# Patient Record
Sex: FEMALE | Race: WHITE
[De-identification: names, ages, dates, MRNs, and addresses within clinical notes are randomized per-mention and may not be internally consistent; named-entity substitution may affect disease eponyms.]

---

## 2021-04-15 DIAGNOSIS — Z23 ENCOUNTER FOR IMMUNIZATION: ICD-10-CM

## 2024-10-24 ENCOUNTER — HOSPITAL ENCOUNTER (OUTPATIENT)
Dept: HOSPITAL 99 - HWRAD | Age: 54
End: 2024-10-24
Payer: COMMERCIAL

## 2024-10-24 DIAGNOSIS — K59.00: Primary | ICD-10-CM

## 2024-11-20 ENCOUNTER — HOSPITAL ENCOUNTER (OUTPATIENT)
Dept: HOSPITAL 99 - GI | Age: 54
Discharge: HOME | End: 2024-11-20
Payer: COMMERCIAL

## 2024-11-20 DIAGNOSIS — K63.5: ICD-10-CM

## 2024-11-20 DIAGNOSIS — K64.8: ICD-10-CM

## 2024-11-20 DIAGNOSIS — R93.3: Primary | ICD-10-CM

## 2024-11-20 DIAGNOSIS — K51.30: ICD-10-CM

## 2024-11-20 DIAGNOSIS — K57.30: ICD-10-CM

## 2024-11-20 PROCEDURE — 45380 COLONOSCOPY AND BIOPSY: CPT

## 2024-11-20 PROCEDURE — 88305 TISSUE EXAM BY PATHOLOGIST: CPT

## 2024-12-04 LAB
ALBUMIN SERPL-MCNC: 4.5 G/DL (ref 3.5–5)
ALP SERPL-CCNC: 89 U/L (ref 38–126)
ALT SERPL-CCNC: 25 U/L (ref 0–35)
APTT PPP: 28.5 SEC (ref 23.4–35)
AST SERPL-CCNC: 25 U/L (ref 14–36)
BUN SERPL-MCNC: 12 MG/DL (ref 7–17)
CALCIUM SERPL-MCNC: 9.6 MG/DL (ref 8.4–10.2)
CHLORIDE SERPL-SCNC: 103 MMOL/L (ref 98–107)
CO2 SERPL-SCNC: 29 MMOL/L (ref 22–30)
EGFR: > 60
ERYTHROCYTE [DISTWIDTH] IN BLOOD BY AUTOMATED COUNT: 13.4 % (ref 11.5–14.5)
GLUCOSE SERPL-MCNC: 112 MG/DL (ref 70–99)
HBA1C MFR BLD: 6.1 % (ref 4–5.6)
HCT VFR BLD AUTO: 40.9 % (ref 37–47)
HGB BLD-MCNC: 13.8 G/DL (ref 12–16)
INR PPP: 0.84
MCHC RBC AUTO-ENTMCNC: 33.7 G/DL (ref 33–37)
MCV RBC AUTO: 86.5 FL (ref 81–99)
PLATELET # BLD AUTO: 288 10^3/UL (ref 130–400)
POTASSIUM SERPL-SCNC: 4.3 MMOL/L (ref 3.5–5.1)
PROT SERPL-MCNC: 7.3 G/DL (ref 6.3–8.2)
PROTHROMBIN TIME: 12 SEC (ref 11.4–14.6)
SODIUM SERPL-SCNC: 144 MMOL/L (ref 135–145)

## 2024-12-11 ENCOUNTER — HOSPITAL ENCOUNTER (INPATIENT)
Dept: HOSPITAL 99 - 2 SOUTH | Age: 54
LOS: 2 days | Discharge: HOME | DRG: 331 | End: 2024-12-13
Payer: COMMERCIAL

## 2024-12-11 VITALS — RESPIRATION RATE: 16 BRPM | DIASTOLIC BLOOD PRESSURE: 83 MMHG | SYSTOLIC BLOOD PRESSURE: 128 MMHG

## 2024-12-11 VITALS — RESPIRATION RATE: 20 BRPM | SYSTOLIC BLOOD PRESSURE: 120 MMHG | DIASTOLIC BLOOD PRESSURE: 78 MMHG

## 2024-12-11 VITALS — SYSTOLIC BLOOD PRESSURE: 132 MMHG | RESPIRATION RATE: 18 BRPM | DIASTOLIC BLOOD PRESSURE: 74 MMHG

## 2024-12-11 VITALS — DIASTOLIC BLOOD PRESSURE: 88 MMHG

## 2024-12-11 VITALS — RESPIRATION RATE: 12 BRPM

## 2024-12-11 VITALS
DIASTOLIC BLOOD PRESSURE: 80 MMHG | OXYGEN SATURATION: 2 % | SYSTOLIC BLOOD PRESSURE: 126 MMHG | RESPIRATION RATE: 10 BRPM

## 2024-12-11 VITALS — RESPIRATION RATE: 18 BRPM | DIASTOLIC BLOOD PRESSURE: 78 MMHG | SYSTOLIC BLOOD PRESSURE: 129 MMHG

## 2024-12-11 VITALS — RESPIRATION RATE: 11 BRPM

## 2024-12-11 VITALS — SYSTOLIC BLOOD PRESSURE: 138 MMHG | DIASTOLIC BLOOD PRESSURE: 88 MMHG | OXYGEN SATURATION: 1 %

## 2024-12-11 VITALS — DIASTOLIC BLOOD PRESSURE: 71 MMHG | SYSTOLIC BLOOD PRESSURE: 118 MMHG | RESPIRATION RATE: 20 BRPM

## 2024-12-11 VITALS — DIASTOLIC BLOOD PRESSURE: 76 MMHG | SYSTOLIC BLOOD PRESSURE: 131 MMHG | RESPIRATION RATE: 18 BRPM

## 2024-12-11 VITALS — DIASTOLIC BLOOD PRESSURE: 72 MMHG | OXYGEN SATURATION: 1 % | SYSTOLIC BLOOD PRESSURE: 122 MMHG

## 2024-12-11 VITALS — DIASTOLIC BLOOD PRESSURE: 76 MMHG | SYSTOLIC BLOOD PRESSURE: 123 MMHG | RESPIRATION RATE: 12 BRPM

## 2024-12-11 VITALS — BODY MASS INDEX: 27.3 KG/M2 | BODY MASS INDEX: 27.9 KG/M2

## 2024-12-11 VITALS — RESPIRATION RATE: 13 BRPM | SYSTOLIC BLOOD PRESSURE: 124 MMHG | DIASTOLIC BLOOD PRESSURE: 76 MMHG

## 2024-12-11 DIAGNOSIS — E78.00: ICD-10-CM

## 2024-12-11 DIAGNOSIS — Z83.79: ICD-10-CM

## 2024-12-11 DIAGNOSIS — Z83.719: ICD-10-CM

## 2024-12-11 DIAGNOSIS — N99.4: ICD-10-CM

## 2024-12-11 DIAGNOSIS — F41.9: ICD-10-CM

## 2024-12-11 DIAGNOSIS — Z90.710: ICD-10-CM

## 2024-12-11 DIAGNOSIS — K56.50: Primary | ICD-10-CM

## 2024-12-11 PROCEDURE — 8E0W8CZ ROBOTIC ASSISTED PROCEDURE OF TRUNK REGION, VIA NATURAL OR ARTIFICIAL OPENING ENDOSCOPIC: ICD-10-PCS | Performed by: COLON & RECTAL SURGERY

## 2024-12-11 PROCEDURE — 0DTNFZZ RESECTION OF SIGMOID COLON, VIA NATURAL OR ARTIFICIAL OPENING WITH PERCUTANEOUS ENDOSCOPIC ASSISTANCE: ICD-10-PCS | Performed by: COLON & RECTAL SURGERY

## 2024-12-11 PROCEDURE — 88307 TISSUE EXAM BY PATHOLOGIST: CPT

## 2024-12-11 RX ADMIN — ACETAMINOPHEN 650 MG: 325 TABLET ORAL at 23:47

## 2024-12-11 RX ADMIN — SODIUM CHLORIDE, SODIUM ACETATE ANHYDROUS, SODIUM GLUCONATE, POTASSIUM CHLORIDE, AND MAGNESIUM CHLORIDE 1000: 526; 222; 502; 37; 30 INJECTION, SOLUTION INTRAVENOUS at 11:10

## 2024-12-11 RX ADMIN — DULOXETINE 20 MG: 20 CAPSULE, DELAYED RELEASE ORAL at 21:39

## 2024-12-11 RX ADMIN — ACETAMINOPHEN 650 MG: 325 TABLET ORAL at 19:46

## 2024-12-11 RX ADMIN — HEPARIN SODIUM 5000 UNITS: 5000 INJECTION, SOLUTION INTRAVENOUS; SUBCUTANEOUS at 11:11

## 2024-12-11 RX ADMIN — ATORVASTATIN CALCIUM 10 MG: 10 TABLET, FILM COATED ORAL at 21:39

## 2024-12-11 RX ADMIN — KETOROLAC TROMETHAMINE 15 MG: 15 INJECTION, SOLUTION INTRAMUSCULAR; INTRAVENOUS at 23:48

## 2024-12-11 RX ADMIN — SIMETHICONE 80 MG: 80 TABLET, CHEWABLE ORAL at 19:46

## 2024-12-11 RX ADMIN — SODIUM CHLORIDE, SODIUM ACETATE ANHYDROUS, SODIUM GLUCONATE, POTASSIUM CHLORIDE, AND MAGNESIUM CHLORIDE 1000: 526; 222; 502; 37; 30 INJECTION, SOLUTION INTRAVENOUS at 17:28

## 2024-12-11 RX ADMIN — KETOROLAC TROMETHAMINE 15 MG: 15 INJECTION, SOLUTION INTRAMUSCULAR; INTRAVENOUS at 17:07

## 2024-12-11 RX ADMIN — ALVIMOPAN 12 MG: 12 CAPSULE ORAL at 11:11

## 2024-12-11 RX ADMIN — OXYCODONE HYDROCHLORIDE 5 MG: 5 TABLET ORAL at 19:46

## 2024-12-11 RX ADMIN — ACETAMINOPHEN 1000 MG: 500 TABLET ORAL at 11:10

## 2024-12-11 RX ADMIN — TOLTERODINE TARTRATE 4 MG: 4 CAPSULE, EXTENDED RELEASE ORAL at 21:39

## 2024-12-11 NOTE — W.IMMPOSTOP
"Surgical Immed Post Op Note"~"-"~"-: "~"Primary Surgeon:  Gustavo Garrido MD"~"Assistants:  TORITO Rivear and CAMERON Stockton"~"Pre-op Diagnosis:  Sigmoid stricture"~"Post-op Diagnosis:   Same"~"Procedure Performed:  Robotic sigmoid colectomy with intracorporeal anastomosis"~"Anesthesia Type:  GET"~"Specimen / Cultures:  Sigmoid colon (suture is proximal)"~"Estimated Blood Loss:  15cc"~"Complications:  None"~"Operative Findings:  Markedly redundant sigmoid colon with pelvic adhesions from previous hysterectomy"~"                              28mm EEA"~"                              Normal leak test"~"                              "~"Patient's  updated via telephone"

## 2024-12-12 VITALS — SYSTOLIC BLOOD PRESSURE: 109 MMHG | DIASTOLIC BLOOD PRESSURE: 63 MMHG | RESPIRATION RATE: 20 BRPM

## 2024-12-12 VITALS — SYSTOLIC BLOOD PRESSURE: 137 MMHG | DIASTOLIC BLOOD PRESSURE: 83 MMHG | RESPIRATION RATE: 16 BRPM

## 2024-12-12 VITALS — DIASTOLIC BLOOD PRESSURE: 83 MMHG | RESPIRATION RATE: 18 BRPM | SYSTOLIC BLOOD PRESSURE: 126 MMHG

## 2024-12-12 VITALS — SYSTOLIC BLOOD PRESSURE: 118 MMHG | DIASTOLIC BLOOD PRESSURE: 74 MMHG | RESPIRATION RATE: 18 BRPM

## 2024-12-12 VITALS — SYSTOLIC BLOOD PRESSURE: 118 MMHG | DIASTOLIC BLOOD PRESSURE: 67 MMHG | RESPIRATION RATE: 18 BRPM

## 2024-12-12 VITALS — RESPIRATION RATE: 18 BRPM | DIASTOLIC BLOOD PRESSURE: 82 MMHG | SYSTOLIC BLOOD PRESSURE: 128 MMHG

## 2024-12-12 LAB
BUN SERPL-MCNC: 6 MG/DL (ref 7–17)
CALCIUM SERPL-MCNC: 8.8 MG/DL (ref 8.4–10.2)
CHLORIDE SERPL-SCNC: 104 MMOL/L (ref 98–107)
CO2 SERPL-SCNC: 27 MMOL/L (ref 22–30)
EGFR: > 60
ERYTHROCYTE [DISTWIDTH] IN BLOOD BY AUTOMATED COUNT: 13.3 % (ref 11.5–14.5)
ESTIMATED CREATININE CLEARANCE: 111 ML/MIN
GLUCOSE SERPL-MCNC: 125 MG/DL (ref 70–99)
HCT VFR BLD AUTO: 37.2 % (ref 37–47)
HCT VFR BLD AUTO: 38.7 % (ref 37–47)
HGB BLD-MCNC: 12.3 G/DL (ref 12–16)
HGB BLD-MCNC: 12.4 G/DL (ref 12–16)
MCHC RBC AUTO-ENTMCNC: 32 G/DL (ref 33–37)
MCV RBC AUTO: 88 FL (ref 81–99)
NRBC BLD AUTO-RTO: 0 %
PLATELET # BLD AUTO: 312 10^3/UL (ref 130–400)
POTASSIUM SERPL-SCNC: 4.4 MMOL/L (ref 3.5–5.1)
SODIUM SERPL-SCNC: 140 MMOL/L (ref 135–145)

## 2024-12-12 RX ADMIN — ACETAMINOPHEN 650 MG: 325 TABLET ORAL at 07:36

## 2024-12-12 RX ADMIN — DULOXETINE 20 MG: 20 CAPSULE, DELAYED RELEASE ORAL at 21:56

## 2024-12-12 RX ADMIN — ACETAMINOPHEN 650 MG: 325 TABLET ORAL at 16:12

## 2024-12-12 RX ADMIN — ACETAMINOPHEN 650 MG: 325 TABLET ORAL at 12:41

## 2024-12-12 RX ADMIN — ATORVASTATIN CALCIUM 10 MG: 10 TABLET, FILM COATED ORAL at 21:56

## 2024-12-12 RX ADMIN — ACETAMINOPHEN 650 MG: 325 TABLET ORAL at 20:12

## 2024-12-12 RX ADMIN — TOLTERODINE TARTRATE 4 MG: 4 CAPSULE, EXTENDED RELEASE ORAL at 21:59

## 2024-12-12 RX ADMIN — SODIUM CHLORIDE, SODIUM ACETATE ANHYDROUS, SODIUM GLUCONATE, POTASSIUM CHLORIDE, AND MAGNESIUM CHLORIDE 1000: 526; 222; 502; 37; 30 INJECTION, SOLUTION INTRAVENOUS at 02:30

## 2024-12-12 RX ADMIN — ACETAMINOPHEN: 325 TABLET ORAL at 05:05

## 2024-12-12 RX ADMIN — ACETAMINOPHEN 650 MG: 325 TABLET ORAL at 23:56

## 2024-12-12 RX ADMIN — KETOROLAC TROMETHAMINE 15 MG: 15 INJECTION, SOLUTION INTRAMUSCULAR; INTRAVENOUS at 12:41

## 2024-12-12 RX ADMIN — ALVIMOPAN 12 MG: 12 CAPSULE ORAL at 07:36

## 2024-12-12 RX ADMIN — KETOROLAC TROMETHAMINE 15 MG: 15 INJECTION, SOLUTION INTRAMUSCULAR; INTRAVENOUS at 06:22

## 2024-12-12 RX ADMIN — SODIUM CHLORIDE, SODIUM ACETATE ANHYDROUS, SODIUM GLUCONATE, POTASSIUM CHLORIDE, AND MAGNESIUM CHLORIDE: 526; 222; 502; 37; 30 INJECTION, SOLUTION INTRAVENOUS at 14:10

## 2024-12-12 RX ADMIN — ALVIMOPAN 12 MG: 12 CAPSULE ORAL at 20:12

## 2024-12-12 NOTE — W.PN.CRS1
"Today's Communication / Plan"~"-"~"-: "~"d/c moncada"~"lovenox"~"advance diet when passes flatus"~"Assessment/Plan"~"-"~"-: "~"POD#1  Robotic sigmoid colectomy with intracorporeal anastomosis"~"-WBC 13.5 as expected post op, labs otherwise normal. Vitals normal."~"-OOB as tolerated."~"-Okay to shower."~"-Continue clears. Advance to fulls once passes flatus. D/C IVFs when tolerating clears."~"-Start lovenox tonight for DVT prophylaxis. TEDS/SCDS in place."~"-D/C moncada"~"-OR pathology pending"~"-Pain control: Tylenol/Toradol standing, Dilaudid PRN. "~"Subjective Data"~"Procedure"~"-: "~"12/11/2024-  Robotic sigmoid colectomy with intracorporeal anastomosis"~"Subjective Data"~"-: "~"Date of Service:  December 12, 2024"~"Patient states she had some cramping which resolved. She has some burping but no flatus yet. She denies nausea or vomiting. She has been out of bed. "~"Objective Data"~"-"~"-: "~"Vital Signs"~"Temp	Pulse	Resp	BP	Pulse Ox"~" 97.8 F 	 86 	 18 	 128/82 	 98 "~" 12/12/24 07:40	 12/12/24 07:40	 12/12/24 07:40	 12/12/24 07:40	 12/12/24 07:40"~"Intake & Output"~"	12/11/24	12/12/24	12/13/24"~"	06:59	06:59	06:59"~"Intake Total		2020 / 2020	"~"Output Total		2275 / 2275	"~"Balance		-255 / -255	"~"Intake:			"~"  Oral fluids		720 / 720	"~"  IV fluids (Total)		1300 / 1300	"~"    normosol		100 / 100	"~"Output:			"~"  Urine, [place]		2275 / 2275	"~"Lab Results"~"12/12/24 05:05 "~"12/12/24 05:05 "~"Physical Exam"~"-"~"General: No Acute Distress and AOx3"~"Abdomen: Soft, Non Distended and Non Tender"~"Skin: Warm and Dry"~"Incision: Clear, Dry, Intact"

## 2024-12-12 NOTE — CM
"Met with pt at bedside"~"Pt reports she lives with her  in a 2 story home; no steps to enter, FF set-up"~"Independent, active, drives"~"DME - none"~"SNF/HH - no past hx"~"Has ride at discharge "~"PCP - [facility]"~"Pharm - [facility]"~"Plan - anticipate home no needs"

## 2024-12-12 NOTE — DOWNTIME
"There was a [facility] Client  Downtime on 12/12/2024 from 0200 to 12/12/2024 at 0325 . Downtime documentation of patient's care, including medication administrations, has been reconciled in the electronic record per guidelines. Refer to the "~"patient's paper chart under the miscellaneous tab to see printed paper medication records and downtime forms."
Pharmacist Note - Vancomycin Dosing    Consult provided for this 45 y.o. female for indication of surgical site infection ( MRSA +). Antibiotic regimen(s): vancomycin and zosyn    Recent Labs      17   1614   WBC  18.8*   CREA  1.22*   BUN  8     Frequency of BMP: daily  Height: 162.6 cm  Weight: 155.6 kg  Est CrCl: >90 ml/min  Temp (24hrs), Av.4 °F (36.9 °C), Min:97.3 °F (36.3 °C), Max:99.3 °F (37.4 °C)    Cultures:   wound with gram stain --MRSA    Goal trough = 10 - 15 mcg/mL    Therapy will be initiated with a loading dose of 2000 mg IV x 1 to be followed by a maintenance dose of 2000 mg IV every 12 hours. Pharmacy to follow patient daily and order levels / make dose adjustments as appropriate.
Negative

## 2024-12-13 VITALS — SYSTOLIC BLOOD PRESSURE: 143 MMHG | DIASTOLIC BLOOD PRESSURE: 87 MMHG | RESPIRATION RATE: 16 BRPM

## 2024-12-13 VITALS — DIASTOLIC BLOOD PRESSURE: 97 MMHG | SYSTOLIC BLOOD PRESSURE: 129 MMHG | RESPIRATION RATE: 16 BRPM

## 2024-12-13 LAB
BUN SERPL-MCNC: 8 MG/DL (ref 7–17)
CALCIUM SERPL-MCNC: 8.7 MG/DL (ref 8.4–10.2)
CHLORIDE SERPL-SCNC: 106 MMOL/L (ref 98–107)
CO2 SERPL-SCNC: 30 MMOL/L (ref 22–30)
EGFR: > 60
ERYTHROCYTE [DISTWIDTH] IN BLOOD BY AUTOMATED COUNT: 13.8 % (ref 11.5–14.5)
ESTIMATED CREATININE CLEARANCE: 110 ML/MIN
GLUCOSE SERPL-MCNC: 100 MG/DL (ref 70–99)
HCT VFR BLD AUTO: 37.3 % (ref 37–47)
HCT VFR BLD AUTO: 40.7 % (ref 37–47)
HGB BLD-MCNC: 12 G/DL (ref 12–16)
HGB BLD-MCNC: 13.2 G/DL (ref 12–16)
MCHC RBC AUTO-ENTMCNC: 32.2 G/DL (ref 33–37)
MCV RBC AUTO: 88.2 FL (ref 81–99)
NRBC BLD AUTO-RTO: 0 %
PLATELET # BLD AUTO: 280 10^3/UL (ref 130–400)
POTASSIUM SERPL-SCNC: 3.9 MMOL/L (ref 3.5–5.1)
SODIUM SERPL-SCNC: 142 MMOL/L (ref 135–145)

## 2024-12-13 RX ADMIN — KETOROLAC TROMETHAMINE: 15 INJECTION, SOLUTION INTRAMUSCULAR; INTRAVENOUS at 13:25

## 2024-12-13 RX ADMIN — ACETAMINOPHEN 650 MG: 325 TABLET ORAL at 16:43

## 2024-12-13 RX ADMIN — ACETAMINOPHEN 650 MG: 325 TABLET ORAL at 08:07

## 2024-12-13 RX ADMIN — ACETAMINOPHEN 650 MG: 325 TABLET ORAL at 04:46

## 2024-12-13 RX ADMIN — ACETAMINOPHEN 650 MG: 325 TABLET ORAL at 12:38

## 2024-12-13 RX ADMIN — ALVIMOPAN 12 MG: 12 CAPSULE ORAL at 08:07

## 2024-12-13 NOTE — W.DCSUMMARY
"Discharge Summary"~"Discharge Data"~"Date of Admission: 12/11/24"~"Date of Discharge: 12/13/24"~"-"~"Pending Results: Yes"~"Additional Pending Results: "~"pathology"~"Discharge Plan"~"-"~"Patient Disposition: Home (Routine Discharge)"~"Discharge Diagnosis/Procedures: Robotic sigmoid colectomy with intracorporeal anastomosis "~"Diet: Low Residue"~"Activity: No strenuous activity"~"Additional Activity: No lifting over 10lbs (gallon of milk) "~"Driving Restrictions: No driving for 1 week"~"Bathing Restrictions: OK to Shower"~"Wound Care: Allow glue to naturally fall off. Do not pick at incisions.  "~"Instructions:  Low-fiber diet"~"Referrals:"~"Hiren Garrido MD [Active] - in two weeks"~"[facility] DO [Family Provider] - "~"Additional Discharge Medication Instructions: Tylenol or Ibuprofen as needed for pain. Maximum dose of Ibuprofen is 3,200mg in 24 hours. Maximum dose of Tylenol is 4,000mg in 24 hours.  "~"Prescriptions:"~"Continued"~"  multivitamin  Tablet "~"   1 tab PO DAILY "~"  acetaminophen [Tylenol] 325 mg Tablet "~"   650 mg PO Q4H PRN (Reason: pain) "~"  atorvastatin 10 mg Tablet "~"   10 mg PO HS "~"  duloxetine [Cymbalta] 20 mg Capsule,Delayed Release(Dr/Ec) "~"   20 mg PO HS "~"  Gemtesa 75 mg Tablet "~"   75 mg PO HS "~"  Vitamin D3   "~"   1 dose PO DAILY "~"  calcium   "~"   1 dose PO DAILY "~"  Entyvio 300 mg Recon Soln "~"   300 mg IV Q8W "~"Discontinued"~"  polyethylene glycol 3350 [Miralax] 17 gram Powder In Packet "~"   17 g PO BID "~"  metronidazole 500 mg Tablet "~"   500 mg PO .[id number] "~"   Rx Instructions:"~"   To be taken day before surgery as directed"~"  neomycin 500 mg Tablet "~"   1,000 mg PO .[id number] "~"   Rx Instructions:"~"   To be taken day before surgery as directed."~"  Sutab 1.479-0.188- 0.225 gram Tablet "~"   24 tab PO PER PKG DIR "~"   Rx Instructions:"~"   Bowel Prep for surbery"~"Discharge Orders:"~"Discharge Patient  (As Directed); Ordered 12/13/24"~"   Ordered By:  Hiren Garrido"~"Discharge Date and Time"~"Print Language: ENGLISH"

## 2024-12-13 NOTE — PTCARENOTE
"Patient rang for RN around 1430 after having a BM. BM was loose, brown, but blood tinged. Patient feeling good. Tolerated lunch. Pain controlled. Felisa Vasquez, PAC made aware of all findings. H/H ordered. Results reported to Dr. Garrido around 1645. "~"Dr. Garrido also made aware of blood tinged stool. Dr. Garrido stated patient was okay for discharge. "

## 2024-12-13 NOTE — W.PN.CRS1
"Today's Communication / Plan"~"-"~"-: "~"low residue"~"restart lovenox/toradol"~"possible d/c later today"~"Assessment/Plan"~"-"~"-: "~"POD#2  Robotic sigmoid colectomy with intracorporeal anastomosis"~"-Labs normal.  Vitals normal."~"-OOB as tolerated."~"-Okay to shower."~"-Advance to low residue."~"-Restart lovenox. TEDS/SCDS in place."~"-OR pathology pending"~"-Pain control: Tylenol/Toradol standing, Dilaudid PRN. "~"-Possible discharge later today if tolerating a low residue diet. "~"Subjective Data"~"Procedure"~"-: "~"12/11/2024-  Robotic sigmoid colectomy with intracorporeal anastomosis"~"Subjective Data"~"-: "~"Date of Service:  December 13, 2024"~"Patient states she has not had any more bleeding. since yesterday. She denies nausea or vomiting. She is having bowel movements and flatus. "~"Objective Data"~"-"~"-: "~"Vital Signs"~"Temp	Pulse	Resp	BP	Pulse Ox"~" 98.2 F 	 80 	 16 	 143/87 	 99 "~" 12/13/24 08:00	 12/13/24 08:00	 12/13/24 08:00	 12/13/24 08:00	 12/13/24 08:00"~"Intake & Output"~"	12/12/24	12/13/24	12/14/24"~"	06:59	06:59	06:59"~"Intake Total	2020 / 2020	480 / 480	"~"Output Total	2275 / 2275		"~"Balance	-255 / -255	480 / 480	"~"Intake:			"~"  Oral fluids	720 / 720	480 / 480	"~"  IV fluids (Total)	1300 / 1300	0 / 0	"~"    normosol	100 / 100		"~"  IV piggybacks		0 / 0	"~"Output:			"~"  Urine, [place]	2275 / 2275		"~"Other:			"~"  Number of approximated MODERATE		4	"~"  amounts of urine			"~"Lab Results"~"12/13/24 07:12 "~"12/13/24 07:12 "~"Physical Exam"~"-"~"General: No Acute Distress and AOx3"~"Abdomen: Soft, Non Distended and Non Tender"~"Skin: Warm and Dry"~"Incision: Clear, Dry, Intact"

## 2024-12-13 NOTE — CM
"Patient seen at bedside."~"No IMM needed."~"Advance to full liq diet"~"PLAN: home, no needs"~"          to transport"